# Patient Record
Sex: FEMALE | URBAN - METROPOLITAN AREA
[De-identification: names, ages, dates, MRNs, and addresses within clinical notes are randomized per-mention and may not be internally consistent; named-entity substitution may affect disease eponyms.]

---

## 2023-01-01 ENCOUNTER — LAB REQUISITION (OUTPATIENT)
Dept: LAB | Facility: CLINIC | Age: 83
End: 2023-01-01
Payer: MEDICARE

## 2023-01-01 ENCOUNTER — HOSPITAL ENCOUNTER (OUTPATIENT)
Dept: WOUND CARE | Facility: HOSPITAL | Age: 83
Discharge: STILL ADMITTED OR ADMINISTRATIVE | End: 2023-05-04
Payer: MEDICARE

## 2023-01-01 ENCOUNTER — TELEPHONE (OUTPATIENT)
Dept: WOUND CARE | Facility: HOSPITAL | Age: 83
End: 2023-01-01
Payer: MEDICARE

## 2023-01-01 ENCOUNTER — HOSPITAL ENCOUNTER (OUTPATIENT)
Dept: WOUND CARE | Facility: HOSPITAL | Age: 83
Discharge: STILL ADMITTED OR ADMINISTRATIVE | End: 2023-04-28
Payer: MEDICARE

## 2023-01-01 ENCOUNTER — HOSPITAL ENCOUNTER (OUTPATIENT)
Dept: WOUND CARE | Facility: HOSPITAL | Age: 83
Discharge: STILL ADMITTED OR ADMINISTRATIVE | End: 2023-05-09
Payer: MEDICARE

## 2023-01-01 LAB

## 2023-01-01 PROCEDURE — G0463 HOSPITAL OUTPT CLINIC VISIT: HCPCS

## 2023-01-01 PROCEDURE — 88312 SPECIAL STAINS GROUP 1: CPT | Performed by: DERMATOLOGY

## 2023-01-01 PROCEDURE — 88313 SPECIAL STAINS GROUP 2: CPT | Mod: 26 | Performed by: DERMATOLOGY

## 2023-01-01 PROCEDURE — 88312 SPECIAL STAINS GROUP 1: CPT | Mod: 26 | Performed by: DERMATOLOGY

## 2023-01-01 PROCEDURE — 88342 IMHCHEM/IMCYTCHM 1ST ANTB: CPT | Mod: 26 | Performed by: DERMATOLOGY

## 2023-01-01 PROCEDURE — 88312 SPECIAL STAINS GROUP 1: CPT | Mod: TC,ORL | Performed by: NURSE PRACTITIONER

## 2023-01-01 PROCEDURE — 88305 TISSUE EXAM BY PATHOLOGIST: CPT | Mod: 26 | Performed by: DERMATOLOGY

## 2023-01-01 PROCEDURE — 29580 STRAPPING UNNA BOOT: CPT

## 2023-01-01 PROCEDURE — 88312 SPECIAL STAINS GROUP 1: CPT | Mod: TC,ORL | Performed by: DERMATOLOGY

## 2023-01-01 PROCEDURE — 97597 DBRDMT OPN WND 1ST 20 CM/<: CPT

## 2023-01-01 RX ORDER — LOSARTAN POTASSIUM 100 MG/1
100 TABLET ORAL EVERY MORNING
COMMUNITY
Start: 2023-01-01

## 2023-01-01 RX ORDER — PANTOPRAZOLE SODIUM 40 MG/1
40 TABLET, DELAYED RELEASE ORAL NIGHTLY
COMMUNITY
Start: 2023-01-01

## 2023-01-01 RX ORDER — CETIRIZINE HYDROCHLORIDE 5 MG/1
TABLET ORAL
COMMUNITY

## 2023-01-01 RX ORDER — PREDNISONE 20 MG/1
20 TABLET ORAL EVERY MORNING
COMMUNITY
Start: 2023-01-01

## 2023-01-01 RX ORDER — DILTIAZEM HYDROCHLORIDE 360 MG/1
1 CAPSULE, EXTENDED RELEASE ORAL DAILY
COMMUNITY
Start: 2022-05-31

## 2023-01-01 RX ORDER — SULFAMETHOXAZOLE AND TRIMETHOPRIM 800; 160 MG/1; MG/1
TABLET ORAL
COMMUNITY
Start: 2023-01-01

## 2023-01-01 RX ORDER — CEPHALEXIN 500 MG/1
500 CAPSULE ORAL 3 TIMES DAILY
COMMUNITY
Start: 2023-01-01

## 2023-01-01 RX ORDER — LORAZEPAM 0.5 MG/1
0.5 TABLET ORAL 3 TIMES DAILY PRN
COMMUNITY
Start: 2023-01-01

## 2023-01-01 RX ORDER — RIVAROXABAN 20 MG/1
20 TABLET, FILM COATED ORAL DAILY
COMMUNITY
Start: 2023-01-01

## 2023-01-01 RX ORDER — METOPROLOL SUCCINATE 200 MG/1
TABLET, EXTENDED RELEASE ORAL
COMMUNITY
Start: 2023-01-01

## 2023-01-01 RX ORDER — AMOXICILLIN AND CLAVULANATE POTASSIUM 875; 125 MG/1; MG/1
TABLET, FILM COATED ORAL
COMMUNITY
Start: 2023-01-01 | End: 2023-01-01 | Stop reason: ALTCHOICE

## 2023-01-01 RX ORDER — FLUTICASONE PROPIONATE 50 MCG
1-2 SPRAY, SUSPENSION (ML) NASAL
COMMUNITY
Start: 2022-01-01

## 2023-01-01 RX ORDER — TORSEMIDE 10 MG/1
TABLET ORAL
COMMUNITY
Start: 2023-01-01

## 2023-04-28 NOTE — WCT INITIAL ASSESSMENT
Wound Care Team Initial Evaluation    04/28/23    Patient Care Team:  Balaji Santiago MD as PCP - General (Family Medicine)  Kayla Kim NP- Referring Provider    Onset Date: about 6 weeks ago    Start of Care (SOC) Date: 4/28/23    Recent Hospitalization: N/A    Certification Start Date: 04/28/23   Certification End Date: 05/27/23    Physician Order: Evaluate and treat per wound care team protocol.    Treatment Diagnosis:Left leg ulcer; fissures to bilateral heels;    Wound History:  Mylnee reports that she had backed into a planter, about six weeks ago. This caused a bruise to her left lateral upper calf. The wound then got infected causing cellulitis. She developed fluid filled blisters. The wound to her posterior calf is a result of a blister opening.  Mylene is currently on an antibiotic for cellulitis. Prior to seeing [facility] wound care, Mylene was seen twice at a wound care in [place].    Occupation, Living and Lifestyle Assessment: Retired .     Activities Limited by Current Condition: none    Treatment included:   Wound cleansing: mild soap and water and normal saline., Selective debridement: curette., Nonselective debridement: abrasion., Surrounding skin treatment:  moisturizer and skin protectant., Dressings: antibacterial silver dressing and hydrocolloid., and Compression therapy/wraps/garments:  two layer lite compression wrap. Example: Coban Lite 2 layer compression system..    Current Assessment:   Mylene presented with an Unna flex, cohesive wrap dressing, and tubular compression  to her left lower leg. She also had a thin foam dressing secured with tape to the right heel. Mylene's daughter, Ximena, stated that Mylene's right leg had also been wrapped to prevent edema while they traveled. This wrap was removed once they arrived in [place]. All dressings were clean, dry, and intact.     All dressings removed. Bilateral legs were washed with mild soap and water. Bilateral dorsalis  pedis and posterior tibialis pulses were assessed with the use of doppler. The left pulse sounded weak, monophasic and turbulent. The right pulse sounded a little stronger and biphasic. The dressing to the right heel did not have any drainage or odor note. No open areas noted to right heel. The area does appear to be pink, pale, with dry skin and scaling. Moisturizer was applied to the right lower leg. Recommended that Mylene apply moisturizer twice a day, especially to heel area.     Left Calf  There are two wounds to the left calf. There was scant to small amount to thin, tan drainage note on dressing. No odor noted. The lateral, upper wound measured 1.7 x 1.1 x 0.1 cm. The wound base was pink, moist, with yellow fibrin and slough. The posterior wound measured 0.6 x 0.8 x 0.1 cm. This wound base was dry, pink, with yellow fibrinous tissue. Surround skin is pale, dry, fragile, and bruised. Wounds were cleansed again with normal saline. Abrasion was used to remove loosely adherent slough and dried skin around wounds.  Moisturizer applied to leg. Hydrofera blue transfer was placed over wounds. Left lower leg was wrapped in a 2 layer lite compression wrap from the base of the toes to base of knee. Mylene was given Tetra , size F, to use if she is unable to keep compression wrap on.      Left Heel  The left heel has a fissure that measured 1.2 x 0.2 x 0.1 cm. The surrounding skin is pale, dry, scaling, and callused. A curette was used to remove callused and scaling. This reveal a pink, pale, and dry wound bed. Wound was cleansed with normal saline. A Duoderm spot dressing was applied to wound.      Will have Mylene follow up weekly until she returns to [place].      Treatment Plan  Cleanse wound with mild soap and water, NS, or wound cleanser.  SCD as needed  Hydrofera blue transfer to left calf wounds  DuoDerm spot to left heel  2 layer lite compression wrap to left lower leg  Mylene will return in one week, sooner with  concerns.  Next appointment is May 4, 2023.              04/28/23 1000   Wound 04/28/23 Open Wound Not Related to Pressure Tibial LEFT LATERAL UPPER CALF; LEFT POSTERIOR CALF:   Date First Assessed/Time First Assessed: 04/28/23 0900   Pre-Existing Wound: Yes  Wound Type: Open Wound Not Related to Pressure  WCT Non-staged Ulcer Description: Full thickness with fat layer exposed  Location Descriptor: Left  Location: Tibial  Wou...   Wound Image                Dressing Status Clean;Dry;Intact   Dressing Changed Yes   Drainage Amount Small (25% or less of dressing)   Drainage Description Tan   Wound Odor None   Wound Cleansing Mild soap and water;Normal saline   Signs of Infection None   Site Assessment Prior to Treatment Pink;Moist;Fibrinous;Yellow   Margins Undefined edges   Surrounding Skin Treatment Moisturizer   Dressing Antibacterial, silver dressing   Compression Therapy/Wraps/Garments Two layer lite compression wrap   Site Comment 2 sites on calf   Date Measured 04/28/23   Wound Length (cm) 1.7 cm   Wound Width (cm) 1.1 cm   Wound Depth (cm) 0.1   Smallest to Largest Wound Range posterior- 0.6x 0.8x0.1 cm   Calculated Wound Size (cm^3)  0.19 cm^3   Calculated Wound Size (cm^2) FOR BILLING REASON 1.87 cm^2   Debridement Performed by RN Yes   Nonselective Debridement by RN Abrasion   Type of Tissue Removed Slough;Other (comment)  (scaling)   Total Length Debrided by RN  (cm) 2 cm   Total Width Debrided by RN  (cm) 2 cm   Total Depth Debrided by RN (cm) 0.1 cm   Total Area Debrided (cm^2) by RN FOR BILLING 4 cm^2   Adherence of Nonviable Tissue Within Wound Loosely adherent   Percentage of Nonviable Tissue Remaining to Site Post Debridement 40-50%   Patient Tolerance During Debridement Fair   Wound 04/28/23 LEFT HEEL   Date First Assessed/Time First Assessed: 04/28/23 0900   Pre-Existing Wound: Yes  WCT Non-staged Ulcer Description: Full thickness with fat layer exposed  Location Descriptor: Left;Plantar  Wound  Description (Comments): LEFT HEEL   Wound Image    Dressing Status Dry;Intact;Clean   Dressing Changed Yes   Drainage Amount None (no measurable drainage)   Wound Odor None   Wound Cleansing Mild soap and water;Normal saline   Signs of Infection None   Site Assessment Prior to Treatment Dry;Pink   Margins Defined edges;Attached edges   Surrounding Skin Assessment Dry skin;Scaling;Cool   Surrounding Skin Treatment Skin protectant   Dressing Hydrocolloid   Compression Therapy/Wraps/Garments Two layer lite compression wrap   Date Measured 04/28/23   Wound Length (cm) 1.2 cm   Wound Width (cm) 0.2 cm   Wound Depth (cm) 0.1   Calculated Wound Size (cm^3)  0.02 cm^3   Calculated Wound Size (cm^2) FOR BILLING REASON 0.24 cm^2   Debridement Performed by RN Yes   Selective Debridement by RN Curette   Type of Tissue Removed Callus   Total Length Debrided by RN  (cm) 1.2 cm   Total Width Debrided by RN  (cm) 0.5 cm   Total Depth Debrided by RN (cm) 0.1 cm   Total Area Debrided (cm^2) by RN FOR BILLING 0.6 cm^2      Edema:       04/28/23 1200   Peripheral Vascular   Peripheral Vascular (WDL) X   Pulses R posterior tibial;L posterior tibial;R pedal;L pedal   R Posterior Tibial Pulse Doppler   R Pedal Pulse Doppler   L Posterior Tibial Pulse Doppler   L Pedal Pulse Doppler   Edema Left lower extremity;Right lower extremity   RLE Edema Non-pitting   LLE Edema Non-pitting       Education:The following education has been completed with patient and family: Compression Therapy, Current Wound Status, Dressing Change, Elevation of Extremities, Pain Management, Risk Factors, and Signs of Infection .     Goals for Treatment:   Wound will have a 50% decrease in wound size and/or depth in 4 weeks. and Site will be free from signs or symptoms of infection.    Frequency and Duration of Treatment: The patient will be seen 1-2 time(s) per week for approximately 45-60 minutes for 4 weeks.    Continued treatment plan will be implemented per wound  care protocol, policies, and procedures.    Thank you for allowing us to share in the care of this patient. If you have any questions, comments or concerns regarding this patient, please feel free to contact the outpatient wound care center.      Signature:    ___________________________________  Kayla Kim NP    Date:    ____________________________________

## 2023-04-28 NOTE — INTERDISCIPLINARY/THERAPY
Mylene is a pleasant 82 yr old that comes to wound care for wounds to her left lateral and posterior calf. Mylene reports that she had backed into a planter, about six weeks ago. This caused a bruise to her left lateral upper calf. The wound then got infected causing cellulitis. She developed fluid filled blisters. The wound to her posterior calf is a result of a blister opening.  Mylene is currently on an antibiotic for cellulitis. Prior to seeing [facility] wound care, Mylene was seen twice at a wound care in [place]. Mylene is currently staying with her daughter in [place]. She will be moving into an assisted living in a couple of weeks.     Mylene presented with an Unna flex, cohesive wrap dressing, and tubular compression  to her left lower leg. She also had a thin foam dressing secured with tape to the right heel. Mylene's daughter, Ximena, stated that Mylene's right leg had also been wrapped to prevent edema while they traveled. This wrap was removed once they arrived in [place]. All dressings were clean, dry, and intact.    All dressings removed. Bilateral legs were washed with mild soap and water. Bilateral dorsalis pedis and posterior tibialis pulses were assessed with the use of doppler. The left pulse sounded weak, monophasic and turbulent. The right pulse sounded a little stronger and biphasic. The dressing to the right heel did not have any drainage or odor note. No open areas noted to right heel. The area does appear to be pink, pale, with dry skin and scaling. Moisturizer was applied to the right lower leg. Recommended that Mylene apply moisturizer twice a day, especially to heel area.    Left Calf  There are two wounds to the left calf. There was scant to small amount to thin, tan drainage note on dressing. No odor noted. The lateral, upper wound measured 1.7 x 1.1 x 0.1 cm. The wound base was pink, moist, with yellow fibrin and slough. The posterior wound measured 0.6 x 0.8 x 0.1 cm. This wound base was dry,  pink, with yellow fibrinous tissue. Surround skin is pale, dry, fragile, and bruised. Wounds were cleansed again with normal saline. Abrasion was used to remove loosely adherent slough and dried skin around wounds.  Moisturizer applied to leg. Hydrofera blue transfer was placed over wounds. Left lower leg was wrapped in a 2 layer lite compression wrap from the base of the toes to base of knee. Mylene was given Tetra , size F, to use if she is unable to keep compression wrap on.     Left Heel  The left heel has a fissure that measured 1.2 x 0.2 x 0.1 cm. The surrounding skin is pale, dry, scaling, and callused. A curette was used to remove callused and scaling. This reveal a pink, pale, and dry wound bed. Wound was cleansed with normal saline. A Duoderm spot dressing was applied to wound.     Will have Mylene follow up weekly until she returns to [place].     Treatment Plan  Cleanse wound with mild soap and water, NS, or wound cleanser.  SCD as needed  Hydrofera blue transfer to left calf wounds  DuoDerm spot to left heel  2 layer lite compression wrap to left lower leg  Mylene will return in one week, sooner with concerns.  Next appointment is May 4, 2023.          04/28/23 1000   Wound 04/28/23 Open Wound Not Related to Pressure Tibial LEFT LATERAL UPPER CALF; LEFT POSTERIOR CALF:   Date First Assessed/Time First Assessed: 04/28/23 0900   Pre-Existing Wound: Yes  Wound Type: Open Wound Not Related to Pressure  WCT Non-staged Ulcer Description: Full thickness with fat layer exposed  Location Descriptor: Left  Location: Tibial  Wou...   Wound Image     Dressing Status Clean;Dry;Intact   Dressing Changed Yes   Drainage Amount Small (25% or less of dressing)   Drainage Description Tan   Wound Odor None   Wound Cleansing Mild soap and water;Normal saline   Signs of Infection None   Site Assessment Prior to Treatment Pink;Moist;Fibrinous;Yellow   Margins Undefined edges   Surrounding Skin Treatment Moisturizer   Dressing  Antibacterial, silver dressing   Compression Therapy/Wraps/Garments Two layer lite compression wrap   Site Comment 2 sites on calf   Date Measured 04/28/23   Wound Length (cm) 1.7 cm   Wound Width (cm) 1.1 cm   Wound Depth (cm) 0.1   Smallest to Largest Wound Range posterior- 0.6x 0.8x0.1 cm   Calculated Wound Size (cm^3)  0.19 cm^3   Calculated Wound Size (cm^2) FOR BILLING REASON 1.87 cm^2   Debridement Performed by RN Yes   Nonselective Debridement by RN Abrasion   Type of Tissue Removed Slough;Other (comment)  (scaling)   Total Length Debrided by RN  (cm) 2 cm   Total Width Debrided by RN  (cm) 2 cm   Total Depth Debrided by RN (cm) 0.1 cm   Total Area Debrided (cm^2) by RN FOR BILLING 4 cm^2   Adherence of Nonviable Tissue Within Wound Loosely adherent   Percentage of Nonviable Tissue Remaining to Site Post Debridement 40-50%   Patient Tolerance During Debridement Fair   Wound 04/28/23 LEFT HEEL   Date First Assessed/Time First Assessed: 04/28/23 0900   Pre-Existing Wound: Yes  WCT Non-staged Ulcer Description: Full thickness with fat layer exposed  Location Descriptor: Left;Plantar  Wound Description (Comments): LEFT HEEL   Wound Image    Dressing Status Dry;Intact;Clean   Dressing Changed Yes   Drainage Amount None (no measurable drainage)   Wound Odor None   Wound Cleansing Mild soap and water;Normal saline   Signs of Infection None   Site Assessment Prior to Treatment Dry;Pink   Margins Defined edges;Attached edges   Surrounding Skin Assessment Dry skin;Scaling;Cool   Surrounding Skin Treatment Skin protectant   Dressing Hydrocolloid   Compression Therapy/Wraps/Garments Two layer lite compression wrap   Date Measured 04/28/23   Wound Length (cm) 1.2 cm   Wound Width (cm) 0.2 cm   Wound Depth (cm) 0.1   Calculated Wound Size (cm^3)  0.02 cm^3   Calculated Wound Size (cm^2) FOR BILLING REASON 0.24 cm^2   Debridement Performed by RN Yes   Selective Debridement by RN Curette   Type of Tissue Removed Callus    Total Length Debrided by RN  (cm) 1.2 cm   Total Width Debrided by RN  (cm) 0.5 cm   Total Depth Debrided by RN (cm) 0.1 cm   Total Area Debrided (cm^2) by RN FOR BILLING 0.6 cm^2

## 2023-05-01 NOTE — TELEPHONE ENCOUNTER
Shaylee called on behalf of her mother Mylene with concerns about redness in the left 2nd toe. She states the toe was more swollen and red this weekend but no increased temperature. Discussed removing the wrap from the left leg and elevating for around 30 minutes. If the redness and swelling persists then we would recommend she be evaluated at urgent care since she did recently have cellulitis in the left leg. They do have a tubular sleeve that can be applied for minimal compression and to help keep dressings in place after the 2 layer coban wrap has been removed. Shaylee verbalized understanding of recommendations, will go to  if she is still having concerns, and Mylene will return to wound care on Thursday for dressing change and assessment.

## 2023-05-04 NOTE — INTERDISCIPLINARY/THERAPY
Mylene ambulated to wound care with us of her walker. She did not have the 2 layer compression wraps on. Mylene had taken a shower on Monday. Daughter, Ximena, had removed wraps for the shower since they were uncomfortable for Mylene. Ximena had used honey guaze, guaze pads, and cohesive wrap to dress wounds.     Left lower leg  There are two wounds to the left calf. There are small amount to thin, tan drainage note on dressing. No odor noted. The lateral, upper wound measured 1.6 x 1.1 x 0.1 cm. The wound base was pink and moist with white slough that was easily removed with wound cleansing. The posterior wound measured 0.6 x 0.8 x 0.2 cm. This wound base was dry and pink. The left heel continues to have a small fissure measuring 1.1 x 0.1 x 0.1 cm. Surround skin is pale, dry, fragile, and bruised. Wounds were cleansed again with commercial cleanser. Because Mylene preferred the unna boot that was applied in [place], care plan was changed. Unna boot applied from  base of toes to base of need. Non-border foam dressing applied over wound area to manage drainage. Followed by a cohesive wrap.     Discussion with Mylene regarding wearing compression socks. Mylene has worn these in the past but no longer has the strength to do this. Ximena is agreeable to helping Mylene put these on. Assisted living will aide Mylene will compression socks when she moves in there. Mylene was asked to bring compression socks to next appointment.      Mylene is returning to [facility] on May 10. Will have her follow up on May 9 before she leaves.      Treatment Plan  Cleanse wound with mild soap and water, NS, or wound cleanser.  SCD as needed  Unna boot  2 Non-border foam dressing  Cohesive wrap  Next appointment is May 9, 2023 @ 11AM     05/04/23 1000   Wound 04/28/23 Open Wound Not Related to Pressure Tibial LEFT LATERAL UPPER CALF; LEFT POSTERIOR CALF:   Date First Assessed/Time First Assessed: 04/28/23 0900   Pre-Existing Wound: Yes  Wound Type: Open Wound Not Related  to Pressure  WCT Non-staged Ulcer Description: Full thickness with fat layer exposed  Location Descriptor: Left  Location: Tibial  Wou...   Wound Image     Dressing Status Dressing in place, not as prescribed   Dressing Changed Yes   Drainage Amount Small (25% or less of dressing)   Drainage Description Tan   Wound Odor None   Wound Cleansing Wound cleanser with preservatives   Signs of Infection None   Site Assessment Prior to Treatment Pink;Moist   Margins Undefined edges   Dressing Non-border Foam   Compression Therapy/Wraps/Garments Unna boot;Cohesive wrap   Date Measured 05/04/23   Wound Length (cm) 1.6 cm   Wound Width (cm) 1.1 cm   Wound Depth (cm) 0.1   Smallest to Largest Wound Range Posterior 0.6 x 0.8 x 0.2 cm   Calculated Wound Size (cm^3)  0.18 cm^3   Wound Healing % 5.26   Calculated Wound Size (cm^2) FOR BILLING REASON 1.76 cm^2   Wound 04/28/23 LEFT HEEL   Date First Assessed/Time First Assessed: 04/28/23 0900   Pre-Existing Wound: Yes  WCT Non-staged Ulcer Description: Full thickness with fat layer exposed  Location Descriptor: Left;Plantar  Wound Description (Comments): LEFT HEEL   Wound Image    Dressing Status Dressing in place, not as prescribed   Dressing Changed Yes   Drainage Amount None (no measurable drainage)   Wound Odor None   Wound Cleansing Commercial wound cleanser   Signs of Infection None   Site Assessment Prior to Treatment Dry;Pink   Margins Defined edges;Attached edges   Surrounding Skin Assessment Dry;Intact   Compression Therapy/Wraps/Garments Unna boot;Cohesive wrap

## 2023-05-09 NOTE — TREATMENT PLAN
Mylene and her daughter will be heading back to [place] tomorrow. Mylene will be moving into an assisted living facility.     Mylene arrives to her outpatient wound care appointment with the Unna boot to her left lower leg clean dry and intact.  There was a small amount of tan drainage.  Both of the wounds to her left lower leg are smaller.      Mylene asks me to assess some other areas on her legs which she is concerned about and she shows me an area on her right anterior thigh which is dark red and raised.  There are 3 other red raised areas today, 2 on her left medial lower leg and 1 on her lateral left lower leg in close proximity to the wound.  There is also a skin tear to her right lateral knee which measures 1.5 x 2 cm.  The flap of the skin tear was well approximated to the wound and I placed a contact layer with silver and a silicone bordered foam dressing over it.  I covered the new red raised areas with a silver contact layer and silicone bordered foam dressings as well.  Knee-high 20 to 30 mmHg compression stockings were then put on.    Mylene's left second toe remains red and edematous from a recent injury.  The skin is intact.    Mylene's daughter asks for recommendations on wound care once they get back to [place].  I recommend making an appointment with the wound clinic for Mylene to be seen as soon as possible once they arrive in [place] due to these new areas of red, raised skin.  During the wound care appointment today, Mylene's daughter calls the wound clinic in [place] and schedules a wound care appointment. I marked the erythema surrounding the site on the right anterior thigh and left medial thighs, instructing them to seek medical care if the redness extends beyond these lines.          Treatment:  Wounds were cleansed with commercial wound cleanser  Skin barrier wipe to the periwound skin  Contact layer with silver, silicone border dressings overall wounds and the new red raised areas  20 to 30 mmHg  compressions stockings  Patient's next wound care appointment will be back in [place] on Thursday.    Left second toe      Right knee skin tear                 05/09/23 1600   Wound 04/28/23 Open Wound Not Related to Pressure Tibial LEFT LATERAL UPPER CALF; LEFT POSTERIOR CALF:   Date First Assessed/Time First Assessed: 04/28/23 0900   Pre-Existing Wound: Yes  Wound Type: Open Wound Not Related to Pressure  WCT Non-staged Ulcer Description: Full thickness with fat layer exposed  Location Descriptor: Left  Location: Tibial  Wou...   Wound Image       Dressing Status Clean;Dry;Intact   Dressing Changed Yes   Drainage Amount Small (25% or less of dressing)   Drainage Description Tan   Wound Odor None   Wound Cleansing Commercial wound cleanser   Signs of Infection None   Site Assessment Prior to Treatment Fibrinous;Moist;Yellow;Slough;Red   Margins Defined edges   Surrounding Skin Treatment Skin protectant;Moisturizer   Dressing Antibacterial, silver dressing;Contact layer;Silicone Border Foam   Compression Therapy/Wraps/Garments 20 - 30 mmHg   Date Measured 05/09/23   Wound Length (cm) 1 cm   Wound Width (cm) 1 cm   Wound Depth (cm) 0.1   Smallest to Largest Wound Range posterior, inferior 0.4x0.5x0.1   Calculated Wound Size (cm^3)  0.1 cm^3   Wound Healing % 47.37   Calculated Wound Size (cm^2) FOR BILLING REASON 1 cm^2   Debridement Performed by RN No   Wound 04/28/23 LEFT HEEL   Date First Assessed/Time First Assessed: 04/28/23 0900   Pre-Existing Wound: Yes  WCT Non-staged Ulcer Description: Full thickness with fat layer exposed  Location Descriptor: Left;Plantar  Wound Description (Comments): LEFT HEEL   Site Assessment Prior to Treatment Dry;Healed   Surrounding Skin Assessment Hyperkeratosis   Surrounding Skin Treatment Moisturizer

## 2023-05-09 NOTE — WCT DISCHARGE ASSESSMENT
Wound Care Team Discharge Final Evaluation    05/09/23    Patient Care Team:  Balaji Santiago MD as PCP - General (Family Medicine)    Onset Date: about 6 weeks ago  Start of Care (SOC) Date: 4/28/23  Recent Hospitalization: N/A    Physician Order: Evaluate and treat per wound care team protocol.     Treatment Diagnosis:Left leg ulcer; fissures to bilateral heels;      Visits From Start Of Care: 3 treatments have been provided since first initiated.    Treatments ranged from 30-45 minutes and were provided 1-2 time(s) per week.    Patient and Caregiver Compliance: Mylene came to appointments on time and as scheduled.     Treatment within the last 30 days: Treatments provided per wound care protocol. See initial plan of care and progress notes.    Current Assessment:   Mylene and her daughter will be heading back to [place] tomorrow. Mylene will be moving into an assisted living facility.      Mylene arrives to her outpatient wound care appointment with the [place] boot to her left lower leg clean dry and intact.  There was a small amount of tan drainage.  Both of the wounds to her left lower leg are smaller.       Mylene asks me to assess some other areas on her legs which she is concerned about and she shows me an area on her right anterior thigh which is dark red and raised.  There are 3 other red raised areas today, 2 on her left medial lower leg and 1 on her lateral left lower leg in close proximity to the wound.  There is also a skin tear to her right lateral knee which measures 1.5 x 2 cm.  The flap of the skin tear was well approximated to the wound and I placed a contact layer with silver and a silicone bordered foam dressing over it.  I covered the new red raised areas with a silver contact layer and silicone bordered foam dressings as well.  Knee-high 20 to 30 mmHg compression stockings were then put on.     Mylene's left second toe remains red and edematous from a recent injury.  The skin is intact.     Mylene's  daughter asks for recommendations on wound care once they get back to [place].  I recommend making an appointment with the wound clinic for Mylene to be seen as soon as possible once they arrive in [place] due to these new areas of red, raised skin.  During the wound care appointment today, Mylene's daughter calls the wound clinic in [place] and schedules a wound care appointment. I marked the erythema surrounding the site on the right anterior thigh and left medial thighs, instructing them to seek medical care if the redness extends beyond these lines.     Treatment:  Wounds were cleansed with commercial wound cleanser  Skin barrier wipe to the periwound skin  Contact layer with silver, silicone border dressings overall wounds and the new red raised areas  20 to 30 mmHg compressions stockings  Patient's next wound care appointment will be back in [place] on Thursday.       05/09/23 1600   Wound 04/28/23 Open Wound Not Related to Pressure Tibial LEFT LATERAL UPPER CALF; LEFT POSTERIOR CALF:   Date First Assessed/Time First Assessed: 04/28/23 0900   Pre-Existing Wound: Yes  Wound Type: Open Wound Not Related to Pressure  WCT Non-staged Ulcer Description: Full thickness with fat layer exposed  Location Descriptor: Left  Location: Tibial  Wou...   Wound Image       Dressing Status Clean;Dry;Intact   Dressing Changed Yes   Drainage Amount Small (25% or less of dressing)   Drainage Description Tan   Wound Odor None   Wound Cleansing Commercial wound cleanser   Signs of Infection None   Site Assessment Prior to Treatment Fibrinous;Moist;Yellow;Slough;Red   Margins Defined edges   Surrounding Skin Treatment Skin protectant;Moisturizer   Dressing Antibacterial, silver dressing;Contact layer;Silicone Border Foam   Compression Therapy/Wraps/Garments 20 - 30 mmHg   Date Measured 05/09/23   Wound Length (cm) 1 cm   Wound Width (cm) 1 cm   Wound Depth (cm) 0.1   Smallest to Largest Wound Range posterior, inferior 0.4x0.5x0.1    Calculated Wound Size (cm^3)  0.1 cm^3   Wound Healing % 47.37   Calculated Wound Size (cm^2) FOR BILLING REASON 1 cm^2   Debridement Performed by RN No   Wound 04/28/23 LEFT HEEL   Date First Assessed/Time First Assessed: 04/28/23 0900   Pre-Existing Wound: Yes  WCT Non-staged Ulcer Description: Full thickness with fat layer exposed  Location Descriptor: Left;Plantar  Wound Description (Comments): LEFT HEEL   Site Assessment Prior to Treatment Dry;Healed   Surrounding Skin Assessment Hyperkeratosis   Surrounding Skin Treatment Moisturizer          Wound/Sites Healed/Resolved Since SOC: no     Are all sites healed? no      Education:The following education has been completed with patient and family: Compression Therapy, Consult Your Physician, Current Wound Status, Diet and Nutrition, [place] and [place] Compression Garments, Discharge , Disease Process, Dressing Change, Elevation of Extremities, Medications, Offload Pressure, Pain Management, Risk Factors, Signs of Infection , and Wound Care Medications.      Discharged from Wound Care due to  Mylene returning to [place] .    Discharge Recommendations: Keep dressings clean, dry and intact until your next wound care appointment in [place] on Thursday. Take off the compression stockings if your legs become cold or painful.      Thank you for allowing us to share in the care of this patient. If you have any questions, comments or concerns regarding this patient, please feel free to contact the outpatient wound care center.      Provider Signature:    ________________________________________    Date:    ________________________________________    Time:    ________________________________________